# Patient Record
Sex: MALE | Race: WHITE | URBAN - METROPOLITAN AREA
[De-identification: names, ages, dates, MRNs, and addresses within clinical notes are randomized per-mention and may not be internally consistent; named-entity substitution may affect disease eponyms.]

---

## 2020-01-05 ENCOUNTER — OFFICE VISIT (OUTPATIENT)
Dept: ORTHOPEDICS | Facility: CLINIC | Age: 40
End: 2020-01-05
Payer: COMMERCIAL

## 2020-01-05 VITALS — WEIGHT: 254 LBS | BODY MASS INDEX: 32.6 KG/M2 | HEIGHT: 74 IN

## 2020-01-05 DIAGNOSIS — M54.50 ACUTE RIGHT-SIDED LOW BACK PAIN WITHOUT SCIATICA: Primary | ICD-10-CM

## 2020-01-05 RX ORDER — DICLOFENAC SODIUM 75 MG/1
75 TABLET, DELAYED RELEASE ORAL 2 TIMES DAILY
Qty: 20 TABLET | Refills: 0 | Status: SHIPPED | OUTPATIENT
Start: 2020-01-05

## 2020-01-05 RX ORDER — DICLOFENAC SODIUM 75 MG/1
75 TABLET, DELAYED RELEASE ORAL 2 TIMES DAILY
Qty: 20 TABLET | Refills: 0 | Status: SHIPPED | OUTPATIENT
Start: 2020-01-05 | End: 2020-01-05

## 2020-01-05 ASSESSMENT — MIFFLIN-ST. JEOR: SCORE: 2138.95

## 2020-01-05 NOTE — LETTER
1/5/2020       RE: Luis M Orozco  [address] [place] [address]     Dear Colleague,    Thank you for referring your patient, Luis M Orozco, to the [facility] SPORTS AND ORTHOPAEDIC WALK IN CLINIC at [facility]. Please see a copy of my visit note below.         NEW PATIENT INTAKE QUESTIONNAIRE  SPORTS & ORTHOPEDIC WALK-IN 1/5/2020    Primary Care Physician: Dr. Baer    Where are the majority of your medical records? [facility]    Yesterday he was throwing sand on the ground and he felt an extreme sharp pain where he could barely move. The majority of pain is in low back SI on right side. The pain seems to feel better if he bends forward slightly.    Reason for Visit:    What part of your body is injured / painful?  right low back    What caused the injury /pain? Unsure    How long ago did your injury occur or pain begin? yesterday    What are your most bothersome symptoms? Pain and Giving way or instability    How would you characterize your symptom? sharp and shooting    What makes your symptoms better? Ice, laying flat on the bed    What makes your symptoms worse? Standing, Walking, Sitting and Movement    Have you been previously seen for this problem? No    Medical History:    Medical History: None    Have you had surgery on this body part before? No    Medications: None    Allergies: No known drug allergies    Family History of Medical Problems: Several heart issues    Previous Surgeries: None    Social History:    Occupation:     Handedness: Right    Exercise: 4-5 days/week    Review of Systems:    Have you recently had a a fever, chills, weight loss? No    Do you have any vision problems? No    Do you have any chest pain or edema? No    Do you have any shortness of breath or wheezing?  No    Do you have stomach problems? Yes, GI    Do you have any numbness or focal weakness? No    Do you have diabetes? No    Do you have problems with bleeding  "or clotting? No    Do you have an rashes or other skin lesions? No    Communication:    How did you hear about us? [employer]           CHIEF COMPLAINT:  Pain of the Lower Back       HISTORY OF PRESENT ILLNESS  Mr. Orozco is a pleasant 39 year old year old male who presents to clinic today with acute low back pain.  Luis M explains that pain began yesterday.  Recalls event yesterday where he was using a bag of sand and dispersing sand on his driveway when he experienced extreme sharp pain of his low back.  He states that this became difficult to move after this time.  Majority of his pain is located in the low back and does radiate to the right gluteal region.  He states that it is improved with resting as well as bending forward slightly in a seated position.  Worsened with walking and movement.    Denies any lower extremity weakness.  No numbness or tingling.  Treatments to date: Ice and ibuprofen    Additional history: as documented    MEDICAL HISTORY  There is no problem list on file for this patient.      No current outpatient medications on file.       No Known Allergies    No family history on file.    Additional medical/Social/Surgical histories reviewed in [employer] and updated as appropriate.     REVIEW OF SYSTEMS (1/5/2020)  A 10-point review of systems was obtained and is negative except for as noted in the HPI.      PHYSICAL EXAM  Ht 1.867 m (6' 1.5\")   Wt 115.2 kg (254 lb)   BMI 33.06 kg/m       General  - normal appearance, in no obvious distress  CV  - normal peripheral perfusion  Pulm  - normal respiratory pattern, non-labored  Musculoskeletal - lumbar spine  - stance: slow to rise and sit  - inspection: normal bone and joint alignment, no obvious scoliosis  - palpation: bilateral lumbar paravertebral spasm, tenderness R>L  - ROM: pain with bilateral rotation, flexion past 30 deg, extension  - strength: lower extremities 5/5 in all planes  - special tests:  (-) straight leg raise bilaterally  (-) slump " test  Neuro  - patellar and Achilles DTRs 2+ bilaterally, no sensory or motor deficit, grossly normal coordination, normal muscle tone  Skin  - no ecchymosis, erythema, warmth, or induration, no obvious rash  Psych  - interactive, appropriate, normal mood and affect    IMAGING : Deferred today     ASSESSMENT & PLAN  Mr. Orozco is a 39 year old year old male who presents to clinic today with acute lumbago without radiculopathy. No prior LBP or injury, no red flags.    -Diclofenac BID  -Tizanidine at bedtime  -Ice therapy today tomorrow, then heat  -Consider lidocaine patches  -Follow up PRN 1-2 weeks if persisting consider imaging/PT    It was a pleasure seeing Luis M today.    Bryan Mojica DO, CAM  Primary Care Sports Medicine      Again, thank you for allowing me to participate in the care of your patient.      Sincerely,    Bryan Mojica DO

## 2020-01-05 NOTE — PROGRESS NOTES
"CHIEF COMPLAINT:  Pain of the Lower Back       HISTORY OF PRESENT ILLNESS  Mr. Orozco is a pleasant 39 year old year old male who presents to clinic today with acute low back pain.  Luis M explains that pain began yesterday.  Recalls event yesterday where he was using a bag of sand and dispersing sand on his driveway when he experienced extreme sharp pain of his low back.  He states that this became difficult to move after this time.  Majority of his pain is located in the low back and does radiate to the right gluteal region.  He states that it is improved with resting as well as bending forward slightly in a seated position.  Worsened with walking and movement.    Denies any lower extremity weakness.  No numbness or tingling.  Treatments to date: Ice and ibuprofen    Additional history: as documented    MEDICAL HISTORY  There is no problem list on file for this patient.      No current outpatient medications on file.       No Known Allergies    No family history on file.    Additional medical/Social/Surgical histories reviewed in [employer] and updated as appropriate.     REVIEW OF SYSTEMS (1/5/2020)  A 10-point review of systems was obtained and is negative except for as noted in the HPI.      PHYSICAL EXAM  Ht 1.867 m (6' 1.5\")   Wt 115.2 kg (254 lb)   BMI 33.06 kg/m      General  - normal appearance, in no obvious distress  CV  - normal peripheral perfusion  Pulm  - normal respiratory pattern, non-labored  Musculoskeletal - lumbar spine  - stance: slow to rise and sit  - inspection: normal bone and joint alignment, no obvious scoliosis  - palpation: bilateral lumbar paravertebral spasm, tenderness R>L  - ROM: pain with bilateral rotation, flexion past 30 deg, extension  - strength: lower extremities 5/5 in all planes  - special tests:  (-) straight leg raise bilaterally  (-) slump test  Neuro  - patellar and Achilles DTRs 2+ bilaterally, no sensory or motor deficit, grossly normal coordination, normal muscle " tone  Skin  - no ecchymosis, erythema, warmth, or induration, no obvious rash  Psych  - interactive, appropriate, normal mood and affect    IMAGING : Deferred today     ASSESSMENT & PLAN  Mr. Orozco is a 39 year old year old male who presents to clinic today with acute lumbago without radiculopathy. No prior LBP or injury, no red flags.    -Diclofenac BID  -Tizanidine at bedtime  -Ice therapy today tomorrow, then heat  -Consider lidocaine patches  -Follow up PRN 1-2 weeks if persisting consider imaging/PT    It was a pleasure seeing Luis M today.    Bryan Mojica DO, CAQSM  Primary Care Sports Medicine

## 2020-01-05 NOTE — PROGRESS NOTES
NEW PATIENT INTAKE QUESTIONNAIRE  SPORTS & ORTHOPEDIC WALK-IN 1/5/2020    Primary Care Physician: Dr. Baer    Where are the majority of your medical records? [email or web address]    Yesterday he was throwing sand on the ground and he felt an extreme sharp pain where he could barely move. The majority of pain is in low back SI on right side. The pain seems to feel better if he bends forward slightly.    Reason for Visit:    What part of your body is injured / painful?  right low back    What caused the injury /pain? Unsure    How long ago did your injury occur or pain begin? yesterday    What are your most bothersome symptoms? Pain and Giving way or instability    How would you characterize your symptom? sharp and shooting    What makes your symptoms better? Ice, laying flat on the bed    What makes your symptoms worse? Standing, Walking, Sitting and Movement    Have you been previously seen for this problem? No    Medical History:    Medical History: None    Have you had surgery on this body part before? No    Medications: None    Allergies: No known drug allergies    Family History of Medical Problems: Several heart issues    Previous Surgeries: None    Social History:    Occupation:     Handedness: Right    Exercise: 4-5 days/week    Review of Systems:    Have you recently had a a fever, chills, weight loss? No    Do you have any vision problems? No    Do you have any chest pain or edema? No    Do you have any shortness of breath or wheezing?  No    Do you have stomach problems? Yes, GI    Do you have any numbness or focal weakness? No    Do you have diabetes? No    Do you have problems with bleeding or clotting? No    Do you have an rashes or other skin lesions? No    Communication:    How did you hear about us? [employer]          Will contact the patient in June , 6 months before she is due for her colonoscopy.

## 2020-01-05 NOTE — LETTER
Date:January 6, 2020      Patient was self referred, no letter generated. Do not send.        [facility] Information

## 2020-01-05 NOTE — PATIENT INSTRUCTIONS
Low back pain     What is low back pain?    Low back pain is pain and stiffness in the lower back.  It is one of the most common reasons people miss work.      How does it occur?    Low back pain is usually caused when a ligament or muscle holding a vertebra in its proper position is strained.  Vertebrae are bones that make up the spinal column through which the spinal cord passes.  When these muscles or ligaments become weak, the spine loses its ability, resulting in pain.  Because nerves reach all parts of the body from the spinal cord, back problems can lead to pain or weakness in almost any part of the body.      Low back pain can occur if your job involves lifting and carrying heavy objects, or if you spend a lot of time sitting or standing in one position or bending over.  It can be caused by a fall or by unusually strenuous exercise.  It can be brought on by the tension and stress that causes headaches in some people.  It can even be brought on by violent sneezing or coughing.      People who are overweight may have low back pain because of the added stress on their back.      Back pain may occur when the muscles, joints, bones and connective tissues in the back become inflamed as a result of an infection or an immune system problem.  Arthritic disorders as well as some congenital and degenerative conditions may cause back pain.      Back pain accompanied by loss of bladder or bowel control, difficulty moving your legs, or numbness or tingling in your arms or legs may indicate an injury to your spine and nerves, which requires immediate medical treatment.      What are the symptoms?    Symptoms include:      Pain in the back or legs    Stiffness and limited motion    The pain may be continuous or may occur in certain positions.  It may be aggravated by coughing, sneezing, bending, twisting, or straining during a bowel movement. The pain may occur in only one spot or may spread to other areas, most commonly  down the buttocks and into the back of the thigh.     A low back strain typically does not produce pain past the knee into the calf or foot.  Tingling and numbness in the calf or foot may indicate a herniated disk or pinched nerve.      How is it diagnosed?    Your healthcare provider will review your medical history and examine you.  He or she may order X-rays.  In certain situations, a myelogram, CT scan, or MRI may be ordered.    How is it treated?    The early stages of back pain with muscle spasms should be treated with ice packs for 20-30 minutes every 4 to 6 hours for the first 2 to 3 days. You m ay lie on a frozen gel pack, crushed ice, or a bag of frozen peas.    The following are always to treat low back pain:      After the initial injury, applying heat from a heating pad or hot water bottle    Resting in bed on a firm mattress.  Often it helps to lie on your back with your knees raised.  However, isome people prefer to lie on their side with their knees bent.      Taking aspirin, ibuprofen, or other anti-inflammatory medications; muscle relaxants; or other pain medications if recommended by your healthcare provider (adults aged 65 years and older should not take non-steroidal anti-inflammatory medicine for more than 7 days without their healthcare provider's approval).    Having your back massaged by a trained person    Having traction, if recommended by your provider    Wearing a belt or corset to support your back    Talking with a counselor, if your back pain is related to tension caused by emotional problems.    Beginning a program of physical therapy, or exercising on your own.  Begin a regular exercise program to gently stretch and strengthen your muscles as soon as you can.  Your healthcare provider or physical therapist can recommend exercises that will not only help you feel better but will strengthen your muscles and help avoid back trouble later.      When the pain subsides, ask your  healthcare provider about starting an exercise program such as the following:       Exercise moderately every day, using stretching and warm-up exercises suggested by your provider or physical therapist.    Exercise vigorously for about 30 minutes two or three times a week by walking, swimming, using a stationary bicycle, or doing low-impact aerobics.    Participating regularly in an exercise program will not only help your back, it will also help keep you healthier overall.     How long will the effects last?      The effects of back pain last as long as the cause exists or until your body recovers from the strain, usually a day or two but sometimes weeks.     How can I take care of myself?    In addition to the treatment described above, keep in mind these suggestions:      Use an electric heating pad on a low setting (or a hot water bottle wrapped in a towel to avoid burning yourself) for 20 to 30 minutes.  Don't let the heating pad get too hot, and don't fall asleep with it.  You could get a burn.     Try putting an ice pack wrapped in a towel on your back for 20 minutes, one to four times a day.  Set an alarm to avoid frostbite from using the ice pack too long.    Put a pillow under your knees when you are lying down.    Sleep without a pillow under your head.    Lose weight if you are overweight.    Practice good posture.  Stand with your head up, shoulders straight and chest forward, weight balanced evenly on both feet, and pelvis tucked in.     Pain is the best way to  the pace you should set in increasing your activity and exercise.  Minor discomfort, stiffness, soreness, and mild aches need not interfere with activity.  However, limit your activity temporarily if:      your symptoms return    the pain increases when you are more active    the pain increases within 24 hours after a new or higher level of activity    When can I return to my sport or activity?    The goal of rehabilitation is to return  you to your sport or activity as soon as safely possible. If you return too soon you may worsen your injury, which could lead to permanent damage.  Everyone recovers from injury at different rate.  Return to your sport will be determined by how soon your back recovers, not by how many days or weeks it has been since your injury occurred.  In general, the longer you have symptoms before you start treatment, the longer it will take to get better.      It is important that you have fully recovered from your low back pain before you return to your sport or any strenuous activity.  You must be able to have the same range of motion that you had before your injury.  You must be able to run, jump and twist without pain.      What can I do to help prevent low back pain?    You can reduce the strain on your back by doing the following:      Don't push with your arms when you move a heavy object.  Turn around and push backwards so the strain is taken by your legs.     Whenever you sit, sit in a straight-backed chair and hold your spine against the back of the chair.     Bend your knees and hips and keep your back straight when you lift a heavy object.     Avoid lifting heavy objects higher than your waist    Hold packages you carry close to your body, with your arms bent.    Use a footrest for one foot when you stand or sit in one spot for a long time.  This keeps your back straight.    Bend your knees when you bend over.    Sit close to the pedals when you drive and use your seat belt and a hard backrest or pillow.     Lie on your side with your knees bent when you sleep or rest.  It may help to put a pillow between your knees.    Put a pillow under your knees when you sleep on your back.    Raise the foot of the bed 8 inches to discourage sleeping on your stomach unless you have other problems that require that you keep your head elevated.      To rest your back, hold each of these positions for 5 minutes or longer:  Lie on  your back, bend your knees, and put pillows under your knees.    Lie on your back, put a pillow under your neck, bend your knees to a 90-degree angle, and put your lower legs and feet on a chair.    Lie on your back, bend your knees, and bring one knee up to your chest and hold it there.  Repeat with the other knee, then bring both knees to your chest.  When holding your knee to your chest, grab your thigh rather than your lower leg to avoid over flexing your knee.           Exercises that stretch and strengthen the muscles of your abdomen and spine can help prevent back problems. Strong back and abdominal muscles help you keep good posture, with your spine in its correct position.    If your muscles are tight, take a warm shower or bath before doing the exercises. Exercise on a rug or mat. Wear loose clothing. Don t wear shoes. Stop doing any exercise that causes pain until you have talked with your healthcare provider.    Ask your provider or physical therapist to help you develop an exercise program. Ask your provider how many times a week you need to do the exercises. Remember to start slowly.    Excerpts from: The Sports Medicine Patient Advisor 3rd edition. Copyright 2010 [employer].     Low Back Pain Exercises    EXERCISES  These exercises are intended only as suggestions. Be sure to check with your provider before starting the exercises.    Standing hamstring stretch: Put the heel of one leg on a stool about 15 inches high. Keep your leg straight. Lean forward, bending at the hips until you feel a mild stretch in the back of your thigh. Make sure you do not roll your shoulders or bend at the waist when doing this. You want to stretch your leg, not your lower back. Hold the stretch for 15 to 30 seconds. Repeat with each leg 3 times.    Cat and camel: Get down on your hands and knees. Let your stomach sag, allowing your back to curve downward. Hold this position for 5 seconds. Then arch your back and  hold for 5 seconds. Do 2 sets of 15.    Quadruped arm and leg raise: Get down on your hands and knees. Pull in your belly button and tighten your abdominal muscles to stiffen your spine. While keeping your abdominals tight, raise one arm and the opposite leg away from you. Hold this position for 5 seconds. Lower your arm and leg slowly and change sides. Do this 10 times on each side.    Pelvic tilt: Lie on your back with your knees bent and your feet flat on the floor. Pull your belly button in towards your spine and push your lower back into the floor, flattening your back. Hold this position for 15 seconds, then relax. Repeat 5 to 10 times.  Partial curl: Lie on your back with your knees bent and your feet flat on the floor. Draw in your abdomen and tighten your stomach muscles. With your hands stretched out in front of you, curl your upper body forward until your shoulders clear the floor. Hold this position for 3 seconds. Don't hold your breath. It helps to breathe out as you lift your shoulders. Relax back to the floor. Repeat 10 times. Build to 2 sets of 15. To challenge yourself, clasp your hands behind your head and keep your elbows out to your sides.    Gluteal stretch: Lie on your back with both knees bent. Rest your right ankle over the knee of your left leg. Grasp the thigh of the left leg and pull toward your chest. You will feel a stretch along the buttocks and possibly along the outside of your hip. Hold the stretch for 15 to 30 seconds. Then repeat the exercise with your left ankle over your right knee. Do the exercise 3 times with each leg.    Extension exercise  Lie face down on the floor for 5 minutes. If this hurts too much, lie face down with a pillow under your stomach. This should relieve your leg or back pain. When you can lie on your stomach for 5 minutes without a pillow, you can continue with Part B of this exercise.    After lying on your stomach for 5 minutes, prop yourself up on your  elbows for another 5 minutes. If you can do this without having more leg or buttock pain, you can start doing part C of this exercise.    Lie on your stomach with your hands under your shoulders. Then press down on your hands and extend your elbows while keeping your hips flat on the floor. Hold for 1 second and lower yourself to the floor. Do 3 to 5 sets of 10 repetitions. Rest for 1 minute between sets. You should have no pain in your legs when you do this, but it is normal to feel some pain in your lower back.    Do this exercise several times a day.    Side plank: Lie on your side with your legs, hips, and shoulders in a straight line. Prop yourself up onto your forearm with your elbow directly under your shoulder. Lift your hips off the floor and balance on your forearm and the outside of your foot. Try to hold this position for 15 seconds and then slowly lower your hip to the ground. Switch sides and repeat. Work up to holding for 1 minute. This exercise can be made easier by starting with your knees and hips flexed toward your chest.    EXERCISES TO AVOID     It s best to avoid the following exercises because they strain the lower back:    Exercises in which you lie on your back and raise and lower both legs together    Full sit-ups or sit-ups with straight legs    Hip twists    SPORTS AND OTHER ACTIVITIES    In addition to strengthening your back muscles, it s helpful to keep your entire body in shape. Good activities for people with back problems include:      Walking    Bicycling    Swimming    Cross-country skiing    Yoga    Sharad Chi    Pilates    Some sports can hurt your back because of rough contact, twisting, sudden impact, or direct stress on your back. Sports that may be dangerous to your back include:      Football    Soccer    Volleyball    Handball    Golf    Weight lifting    Trampoline    Tobogganing    Sledding    Snowmobiling    Snowboarding    Ice hockey            Try lifting both knees to  chest as well. This is a great stretch first thing in the morning, while still in bed.